# Patient Record
Sex: MALE | Race: OTHER
[De-identification: names, ages, dates, MRNs, and addresses within clinical notes are randomized per-mention and may not be internally consistent; named-entity substitution may affect disease eponyms.]

---

## 2020-10-05 NOTE — BRIEF OPERATIVE NOTE
Immediate Post Operative Note


Operative Note


Pre-op Diagnosis:


1. Nasal fracture


2. Nasal septal deviation


3. Hypertrophied right inferior turbinate


4. Hypertrophied left inferior turbinate


5. Ptosis right eyebrow


Procedure:


1. ORIF nasal fracture


2. Septoplasty


3. SMR inf right turbinate


4. SMR inf left turbinate


5. Right upper eyebrow lift


Post-op Diagnosis:  same as pre-op


Surgeon:  Sonny Delacruz


Assistant:  none


Additional Surgeons:  none


Anesthesiologist:  Shemar


Anesthesia:  general


Specimen:  none


Complications:  none


Condition:  stable


Fluids:  D5LR


Estimated Blood Loss:  volume - 25 cc


Drains:  none


Packing:  SinoNasal gel


Implant(s) used?:  No











Sonny Delacruz MD             Oct 5, 2020 18:39

## 2020-10-05 NOTE — DISCHARGE INSTRUCTIONS
Discharge Instructions


Discharge Instructions


Diet:  regular


Resume Normal Activity?:  No


Activity:  light activity


Pneumonia Vaccine:  pt refused vaccine


Influenza Vaccine (Oct to Mar):  pt refused vaccine





For Surgical Patients


Dressing Care:  keep dry and clean


May shower:  No





For Congestive Heart Failure


Reminder


Report to your physician any weight gain of 5 pounds or more in one week.











Sonny Delacruz MD             Oct 5, 2020 18:41

## 2020-10-05 NOTE — PRE-PROCEDURE NOTE/ATTESTATION
Pre-Procedure Note/Attestation


Complete Prior to Procedure


Planned Procedure:  not applicable


Procedure Narrative:


1. ORIF nasal fracture


2. Septoplasty


3. SMR inf right turbinate


4. SMR inf left turbinate


5. Right upper eyebrow lift





Indications for Procedure


Pre-Operative Diagnosis:


1. Nasal fracture


2. Nasal septal deviation


3. Hypertrophied right inferior turbinate


4. Hypertrophied left inferior turbinate


5. Ptosis right eyebrow





Attestation


I attest that I discussed the nature of the procedure; its benefits; risks and 

complications; and alternatives (and the risks and benefits of such 

alternatives), prior to the procedure, with the patient (or the patient's legal 

representative).





I attest that, if there was a reasonable possibility of needing a blood 

transfusion, the patient (or the patient's legal representative) was given the 

[place] Department [employer] standardized written summary, pursuant 

to the [place] Blood Safety Act ([place] Health and Safety Code # 1645, as 

amended).





I attest that I re-evaluated the patient just prior to the surgery and that 

there has been no change in the patient's H&P done by Dr. Whitaker and reviewed 

by me. Both of us are on staff at [facility].











Sonny Delacruz MD             Oct 5, 2020 18:37

## 2020-10-06 ENCOUNTER — HOSPITAL ENCOUNTER (OUTPATIENT)
Dept: HOSPITAL 72 - SUR | Age: 60
Discharge: HOME | End: 2020-10-06
Payer: COMMERCIAL

## 2020-10-06 VITALS — DIASTOLIC BLOOD PRESSURE: 86 MMHG | SYSTOLIC BLOOD PRESSURE: 148 MMHG

## 2020-10-06 VITALS — SYSTOLIC BLOOD PRESSURE: 127 MMHG | DIASTOLIC BLOOD PRESSURE: 80 MMHG

## 2020-10-06 VITALS — DIASTOLIC BLOOD PRESSURE: 95 MMHG | SYSTOLIC BLOOD PRESSURE: 147 MMHG

## 2020-10-06 VITALS — WEIGHT: 168 LBS | BODY MASS INDEX: 27 KG/M2 | HEIGHT: 66 IN

## 2020-10-06 VITALS — DIASTOLIC BLOOD PRESSURE: 79 MMHG | SYSTOLIC BLOOD PRESSURE: 130 MMHG

## 2020-10-06 VITALS — SYSTOLIC BLOOD PRESSURE: 131 MMHG | DIASTOLIC BLOOD PRESSURE: 78 MMHG

## 2020-10-06 VITALS — DIASTOLIC BLOOD PRESSURE: 87 MMHG | SYSTOLIC BLOOD PRESSURE: 147 MMHG

## 2020-10-06 VITALS — DIASTOLIC BLOOD PRESSURE: 76 MMHG | SYSTOLIC BLOOD PRESSURE: 128 MMHG

## 2020-10-06 VITALS — SYSTOLIC BLOOD PRESSURE: 145 MMHG | DIASTOLIC BLOOD PRESSURE: 93 MMHG

## 2020-10-06 VITALS — SYSTOLIC BLOOD PRESSURE: 141 MMHG | DIASTOLIC BLOOD PRESSURE: 89 MMHG

## 2020-10-06 VITALS — DIASTOLIC BLOOD PRESSURE: 82 MMHG | SYSTOLIC BLOOD PRESSURE: 153 MMHG

## 2020-10-06 VITALS — DIASTOLIC BLOOD PRESSURE: 89 MMHG | SYSTOLIC BLOOD PRESSURE: 132 MMHG
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
## 2020-10-06 DIAGNOSIS — S02.2XXA: Primary | ICD-10-CM

## 2020-10-06 DIAGNOSIS — J34.2: ICD-10-CM

## 2020-10-06 DIAGNOSIS — H57.811: ICD-10-CM
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
## 2020-10-06 PROCEDURE — 21330 OPEN TX NOSE FX W/SKELE FIXJ: CPT

## 2020-10-06 PROCEDURE — 67900 REPAIR BROW DEFECT: CPT

## 2020-10-06 PROCEDURE — 94150 VITAL CAPACITY TEST: CPT

## 2020-10-06 PROCEDURE — 30140 RESECT INFERIOR TURBINATE: CPT

## 2020-10-06 PROCEDURE — 94003 VENT MGMT INPAT SUBQ DAY: CPT

## 2020-10-06 PROCEDURE — 30520 REPAIR OF NASAL SEPTUM: CPT

## 2020-10-06 NOTE — ANETHESIA PREOPERATIVE EVAL
Anesthesia Pre-op PMH/ROS


General


Date of Evaluation:  Oct 6, 2020


Time of Evaluation:  08:15


Anesthesiologist:  Shemar


ASA Score:  ASA 2


Mallampati Score


Class I : Soft palate, uvula, fauces, pillars visible


Class II: Soft palate, uvula, fauces visible


Class III: Soft palate, base of uvula visible


Class IV: Only hard plate visible


Mallampati Classification:  Class II


Surgeon:  Jayme


Diagnosis:  Nasal Deformity


Surgical Procedure:  Septoplasty, SMR Turbinates, Nasal Fracture Repair


Anesthesia History:  none


Family History:  no anesthesia problems


Allergies:  


Coded Allergies:  


     No Known Allergies (Unverified , 10/5/20)


Medications:  see eMAR


Patient NPO?:  Yes





Past Medical History


HEENT:  Reports: cataract (L), cataract (R)


Musculoskeletal/Integumentary:  Reports: other - Facial, Skull Fractuire


PSxH Narrative:


Crainiotomy, R Knee Surgery





Anesthesia Pre-op Phys. Exam


Physician Exam





Last Vital Signs








  Date Time  Temp Pulse Resp B/P (MAP) Pulse Ox O2 Delivery O2 Flow Rate FiO2


 


10/6/20 06:49      Room Air  


 


10/6/20 06:45 98.1 57 18 127/80 99   








Constitutional:  NAD


Neurologic:  CN 2-12 intact


Cardiovascular:  RRR


Respiratory:  CTA


Gastrointestinal:  S/NT/ND





Airway Exam


Mallampati Score:  Class II


MO:  full


ROM:  full


Teeth:  missing, intact





Anesthesia Pre-op A/P


Risk Assessment & Plan


Assessment:


ASA 2


Plan:


GA, SED


Status Change Before Surgery:  No





Pre-Antibiotics


Dru Gram Ancef IV


Given Within 1 Hr of Incision:  Yes


Time Given:  08:26











Ric Staton MD                 Oct 6, 2020 07:49

## 2020-10-06 NOTE — IMMEDIATE POST-OP EVALUATION
Immediate Post-Op Evalulation


Immediate Post-Op Evalulation


Procedure:  Septoplasty, SMR Turbinates, Nasal Fracture Repair


Date of Evaluation:  Oct 6, 2020


Time of Evaluation:  09:57


IV Fluids:  500 LR


Blood Products:  0


Estimated Blood Loss:  25


Urinary Output:  0


Blood Pressure Systolic:  131


Blood Pressure Diastolic:  78


Pulse Rate:  62


Respiratory Rate:  16


O2 Sat by Pulse Oximetry:  98


Temperature (Fahrenheit):  97.3


Pain Score (1-10):  2


Nausea:  No


Vomiting:  No


Complications


0


Patient Status:  awake, reacts, patent, none


Hydration Status:  adequate


Dru Gram Ancef IV


Given Within 1 Hr of Incision:  Yes


Time Given:  08:26











Ric Staton MD                 Oct 6, 2020 07:50

## 2020-10-06 NOTE — NUR
Mustache dressing with minimal amount of bleeding and changed. Patient verbalized 
understanding on ability to change dressing himself. Small amount of blood  from sputum 
noted of gel-like material. Patient made aware of nasal packing used. Denies discomfort or 
any difficulty of breathing. Ice packs to both kimi orbital areas applied.

## 2020-10-06 NOTE — OPERATIVE NOTE - DICTATED
DATE OF OPERATION:  10/06/2020

SURGEON:  Sonny Delacruz MD.



ASSISTANT:  None.



ANESTHESIOLOGIST:  Ric Staton MD.



ANESTHESIA:  LMA general anesthesia, 20 mL of 50:50 mixture 1% lidocaine

with 1:100,000 epinephrine and Marcaine 0.5% 1:200,000 epinephrine.

Additionally 4 mL of 4% topical cocaine.  These were placed on four

pledgets, two on either nostril and accounted for at the end of the

case.



PREOPERATIVE DIAGNOSES:  Nasal fracture and ptosis of the right upper

eyelid secondary to brain injury and hitting his nose after falling at

work.



POSTOPERATIVE DIAGNOSES:  Nasal fracture and ptosis of the right upper

eyelid secondary to brain injury and hitting his nose after falling at

work.



FINDINGS:  Septal deviation on the right all the way to the right lateral

wall of the nose as well as nasal fracture a bump on the anterior nose and

on the upper left displaced nasal bone.  Finally, there was ptosis of the

right upper eyebrow.  Please note that an endoscopic approach initially

was going to be used, but he has a plate and some necrosis of bone in that

area and it was felt that that would be inappropriate and dangerous, and

therefore of the old direct brow lift technique was utilized on the

existing line.



PROCEDURES:

1. Right eyebrow repair for ptosis with multilayer advancement flap

closure.

2. Open reduction and internal fixation of nasal fracture.

3. Septoplasty.

4. Submucous resection, right inferior turbinate.

5. Submucous resection, left inferior turbinate.



TECHNIQUE:  The patient was prepped and draped in the usual manner via LMA

general anesthesia.  A time-out was performed and all agreed as to the

procedure and equipment necessary.  I then injected the aforementioned

lidocaine, Marcaine, and epinephrine mixture and placed the nasal pledget

in the nostrils.  Please note, I could not pass it in the right nostril at

all due to the septal deviation.  Next, initially I made an incision in

the left inferior turbinate with a 15 blade.  Two passes with

radiofrequency wand after coating with saline gel in setting of 6 for 10

seconds x2.  I then outfractured the inferior turbinates with a butter

knife.



I started with the right upper brow as this is sterile procedure at this

point.  I made an incision approximately 2 centimeters above the eyebrow

in the crease that already existed.  This was approximately 4 to 5

centimeters long.  I then cut out tissue down to the periosteum.  I then

elevated the muscle and one through the muscle of the inferior aspect with

a 3-0 Mersilene and attached it superiorly tightening up the lower part of

muscle.  I then did a second later with Mersilene in the upper part of the

frontalis muscle to pull this area tight.  I then placed a third Mersilene

to approximate the edges.  This elevated the eyebrow approximately a

centimeter and a half.  Epithelial edges was closed with a 6-0 Prolene

horizontal mattress running suture.  Steri-Strips were placed over this

and a small 2 x 2 with a Tegaderm dressing to cover.



I was unable to approach the right inferior turbinate because of the

septal deviation.  I then went forward with the septoplasty.  A Ramos

incision was made with a 15 blade.  I was able to elevate subperiosteally

and subchondrally with a dental elevator on both sides.  I then was able

to cut out 5 mm height leaving 1 cm inferiorly intact and removed this

with a angled scissors and a straight Elida.  I then was able to sew the

flap back into position and visualized the inferior turbinate on the

right.



The incision anterior inferior aspect of the right inferior turbinate.

Radiofrequency wand coated with saline gel at a setting of 6 was passed

twice in the inferior turbinate.  I then proceeded to outfracture with a

Boies elevator.



Between the cartilage incisions, stab incisions and elevated with a monson

scissors over the anterior nose and then Aufricht.  I then used a gouge

osteotome for medial osteotomy in the lateral osteotome guarded for the

low osteotomies.  Nose was freed.  There was no greenstick fracture.

Stammberger nasal gel was placed in the nose and a mustache dressing.



COUNTS:  Sponge and needle count was correct.



ESTIMATED BLOOD LOSS:  25 mL.



COMPLICATIONS:  None.



DRAINS:  None.



The patient is awake and alert, and stable, extubated in the operating

room and then 20 minutes later in the recovery room.









  ______________________________________________

  Sonny Delacruz M.D.





DR:  NYDIA

D:  10/06/2020 10:02

T:  10/06/2020 10:58

JOB#:  [id number]

CC:

## 2020-10-06 NOTE — 48 HOUR POST ANESTHESIA EVAL
Post Anesthesia Evaluation


Procedure:  Septoplasty, SMR Turbinates, Nasal Fracture Repair


Date of Evaluation:  Oct 6, 2020


Time of Evaluation:  12:12


Blood Pressure Systolic:  128


0:  74


Pulse Rate:  63


Respiratory Rate:  18


Temperature (Fahrenheit):  98


O2 Sat by Pulse Oximetry:  98


Airway:  patent


Nausea:  No


Vomiting:  No


Pain Intensity:  2


Hydration Status:  adequate


Cardiopulmonary Status:


Stable


Mental Status/LOC:  patient returned to baseline


Follow-up Care/Observations:


0


Post-Anesthesia Complications:


0


Follow-up care needed:  ready to discharge











Ric Staton MD                 Oct 6, 2020 07:50